# Patient Record
Sex: FEMALE | Race: WHITE | Employment: OTHER | URBAN - METROPOLITAN AREA
[De-identification: names, ages, dates, MRNs, and addresses within clinical notes are randomized per-mention and may not be internally consistent; named-entity substitution may affect disease eponyms.]

---

## 2018-12-11 ENCOUNTER — APPOINTMENT (OUTPATIENT)
Dept: ULTRASOUND IMAGING | Facility: HOSPITAL | Age: 83
End: 2018-12-11
Attending: EMERGENCY MEDICINE
Payer: MEDICARE

## 2018-12-11 ENCOUNTER — HOSPITAL ENCOUNTER (EMERGENCY)
Facility: HOSPITAL | Age: 83
Discharge: HOME | End: 2018-12-11
Attending: EMERGENCY MEDICINE
Payer: MEDICARE

## 2018-12-11 VITALS
HEART RATE: 95 BPM | DIASTOLIC BLOOD PRESSURE: 64 MMHG | WEIGHT: 175 LBS | OXYGEN SATURATION: 95 % | TEMPERATURE: 99 F | SYSTOLIC BLOOD PRESSURE: 157 MMHG | HEIGHT: 64 IN | BODY MASS INDEX: 29.88 KG/M2 | RESPIRATION RATE: 18 BRPM

## 2018-12-11 DIAGNOSIS — M79.604 PAIN OF RIGHT LOWER EXTREMITY: Primary | ICD-10-CM

## 2018-12-11 PROCEDURE — 99284 EMERGENCY DEPT VISIT MOD MDM: CPT

## 2018-12-11 PROCEDURE — 85025 COMPLETE CBC W/AUTO DIFF WBC: CPT | Performed by: EMERGENCY MEDICINE

## 2018-12-11 PROCEDURE — 93971 EXTREMITY STUDY: CPT | Performed by: EMERGENCY MEDICINE

## 2018-12-11 PROCEDURE — 36415 COLL VENOUS BLD VENIPUNCTURE: CPT

## 2018-12-11 PROCEDURE — 80048 BASIC METABOLIC PNL TOTAL CA: CPT | Performed by: EMERGENCY MEDICINE

## 2018-12-11 RX ORDER — HYDROCODONE BITARTRATE AND ACETAMINOPHEN 5; 325 MG/1; MG/1
1 TABLET ORAL ONCE
Status: COMPLETED | OUTPATIENT
Start: 2018-12-11 | End: 2018-12-11

## 2018-12-11 RX ORDER — HYDROCODONE BITARTRATE AND ACETAMINOPHEN 5; 325 MG/1; MG/1
1-2 TABLET ORAL EVERY 4 HOURS PRN
Qty: 15 TABLET | Refills: 0 | Status: ON HOLD | OUTPATIENT
Start: 2018-12-11 | End: 2018-12-19

## 2018-12-11 RX ORDER — IBUPROFEN 600 MG/1
600 TABLET ORAL EVERY 8 HOURS PRN
Qty: 20 TABLET | Refills: 0 | Status: ON HOLD | OUTPATIENT
Start: 2018-12-11 | End: 2018-12-19

## 2018-12-11 NOTE — ED PROVIDER NOTES
Patient Seen in: [facility] AND [facility] Emergency Department    History   Patient presents with:  Deep Vein Thrombosis (cardiovascular)    Stated Complaint:     HPI    74-year-old female with history of diabetes, hypertension, hyperlipidemia, previous blood c signs reviewed. All other systems reviewed and negative except as noted above.     Physical Exam     ED Triage Vitals [12/11/18 1015]   /64   Pulse 95   Resp 18   Temp 98.6 °F (37 °C)   Temp src Oral   SpO2 95 %   O2 Device None (Room air) 70 - 99 mg/dL    Sodium 137 136 - 144 mmol/L    Potassium 3.9 3.3 - 5.1 mmol/L    Chloride 104 95 - 110 mmol/L    CO2 25 22 - 32 mmol/L    BUN 15 8 - 20 mg/dL    Creatinine 0.79 0.50 - 1.50 mg/dL    Calcium, Total 9.1 8.5 - 10.5 mg/dL    BUN/CREA Ratio 19. dangers of undiagnosed and untreated hypertension. Education regarding lifestyle modifications and the need for appropriate follow-up with their PCP to have their blood pressure re-checked within 1 week was provided.      Medical Record Review: I personall (eight) hours as needed., Normal, Disp-20 tablet, R-0

## 2018-12-11 NOTE — ED INITIAL ASSESSMENT (HPI)
Pt reports right thigh pain that began last night. Pt reports hx of a blood clot in the same leg. Pt states that the pain worsened overnight. Pt denies trauma to ext, recent travels or long car rides. Pt denies being on a blood thinner.

## 2018-12-17 ENCOUNTER — HOSPITAL ENCOUNTER (INPATIENT)
Facility: HOSPITAL | Age: 83
LOS: 2 days | Discharge: HOME | DRG: 552 | End: 2018-12-19
Attending: EMERGENCY MEDICINE | Admitting: INTERNAL MEDICINE
Payer: MEDICARE

## 2018-12-17 ENCOUNTER — APPOINTMENT (OUTPATIENT)
Dept: GENERAL RADIOLOGY | Facility: HOSPITAL | Age: 83
DRG: 552 | End: 2018-12-17
Attending: EMERGENCY MEDICINE
Payer: MEDICARE

## 2018-12-17 ENCOUNTER — APPOINTMENT (OUTPATIENT)
Dept: MRI IMAGING | Facility: HOSPITAL | Age: 83
DRG: 552 | End: 2018-12-17
Attending: EMERGENCY MEDICINE
Payer: MEDICARE

## 2018-12-17 DIAGNOSIS — E83.42 HYPOMAGNESEMIA: ICD-10-CM

## 2018-12-17 DIAGNOSIS — R26.2 INABILITY TO WALK: ICD-10-CM

## 2018-12-17 DIAGNOSIS — M79.18 MUSCULOSKELETAL PAIN: Primary | ICD-10-CM

## 2018-12-17 PROCEDURE — 72148 MRI LUMBAR SPINE W/O DYE: CPT | Performed by: EMERGENCY MEDICINE

## 2018-12-17 PROCEDURE — 96375 TX/PRO/DX INJ NEW DRUG ADDON: CPT

## 2018-12-17 PROCEDURE — 82962 GLUCOSE BLOOD TEST: CPT

## 2018-12-17 PROCEDURE — 96365 THER/PROPH/DIAG IV INF INIT: CPT

## 2018-12-17 PROCEDURE — 99285 EMERGENCY DEPT VISIT HI MDM: CPT

## 2018-12-17 PROCEDURE — 96366 THER/PROPH/DIAG IV INF ADDON: CPT

## 2018-12-17 PROCEDURE — 80048 BASIC METABOLIC PNL TOTAL CA: CPT | Performed by: EMERGENCY MEDICINE

## 2018-12-17 PROCEDURE — 73552 X-RAY EXAM OF FEMUR 2/>: CPT | Performed by: EMERGENCY MEDICINE

## 2018-12-17 PROCEDURE — 85025 COMPLETE CBC W/AUTO DIFF WBC: CPT | Performed by: EMERGENCY MEDICINE

## 2018-12-17 PROCEDURE — 73721 MRI JNT OF LWR EXTRE W/O DYE: CPT | Performed by: EMERGENCY MEDICINE

## 2018-12-17 PROCEDURE — 72170 X-RAY EXAM OF PELVIS: CPT | Performed by: EMERGENCY MEDICINE

## 2018-12-17 PROCEDURE — 83036 HEMOGLOBIN GLYCOSYLATED A1C: CPT | Performed by: INTERNAL MEDICINE

## 2018-12-17 PROCEDURE — 83735 ASSAY OF MAGNESIUM: CPT | Performed by: EMERGENCY MEDICINE

## 2018-12-17 RX ORDER — ONDANSETRON 2 MG/ML
4 INJECTION INTRAMUSCULAR; INTRAVENOUS EVERY 6 HOURS PRN
Status: DISCONTINUED | OUTPATIENT
Start: 2018-12-17 | End: 2018-12-19

## 2018-12-17 RX ORDER — MORPHINE SULFATE 2 MG/ML
2 INJECTION, SOLUTION INTRAMUSCULAR; INTRAVENOUS EVERY 2 HOUR PRN
Status: DISCONTINUED | OUTPATIENT
Start: 2018-12-17 | End: 2018-12-19

## 2018-12-17 RX ORDER — MORPHINE SULFATE 4 MG/ML
6 INJECTION, SOLUTION INTRAMUSCULAR; INTRAVENOUS EVERY 2 HOUR PRN
Status: DISCONTINUED | OUTPATIENT
Start: 2018-12-17 | End: 2018-12-19

## 2018-12-17 RX ORDER — ACETAMINOPHEN 325 MG/1
650 TABLET ORAL EVERY 4 HOURS PRN
Status: DISCONTINUED | OUTPATIENT
Start: 2018-12-17 | End: 2018-12-19

## 2018-12-17 RX ORDER — HYDROCHLOROTHIAZIDE 12.5 MG/1
25 CAPSULE, GELATIN COATED ORAL DAILY
COMMUNITY
End: 2019-09-18

## 2018-12-17 RX ORDER — METHYLPREDNISOLONE 4 MG/1
4 TABLET ORAL
Status: DISCONTINUED | OUTPATIENT
Start: 2018-12-20 | End: 2018-12-19

## 2018-12-17 RX ORDER — DEXTROSE MONOHYDRATE 25 G/50ML
50 INJECTION, SOLUTION INTRAVENOUS AS NEEDED
Status: DISCONTINUED | OUTPATIENT
Start: 2018-12-17 | End: 2018-12-18

## 2018-12-17 RX ORDER — KETOROLAC TROMETHAMINE 15 MG/ML
15 INJECTION, SOLUTION INTRAMUSCULAR; INTRAVENOUS ONCE
Status: COMPLETED | OUTPATIENT
Start: 2018-12-17 | End: 2018-12-17

## 2018-12-17 RX ORDER — METHYLPREDNISOLONE 4 MG/1
4 TABLET ORAL
Status: DISCONTINUED | OUTPATIENT
Start: 2018-12-19 | End: 2018-12-19

## 2018-12-17 RX ORDER — SIMVASTATIN 20 MG
40 TABLET ORAL NIGHTLY
COMMUNITY

## 2018-12-17 RX ORDER — NIFEDIPINE 90 MG/1
90 TABLET, FILM COATED, EXTENDED RELEASE ORAL DAILY
COMMUNITY

## 2018-12-17 RX ORDER — HYDROCODONE BITARTRATE AND ACETAMINOPHEN 5; 325 MG/1; MG/1
1 TABLET ORAL EVERY 4 HOURS PRN
Status: DISCONTINUED | OUTPATIENT
Start: 2018-12-17 | End: 2018-12-19

## 2018-12-17 RX ORDER — GABAPENTIN 300 MG/1
300 CAPSULE ORAL NIGHTLY
Status: DISCONTINUED | OUTPATIENT
Start: 2018-12-17 | End: 2018-12-19

## 2018-12-17 RX ORDER — METOPROLOL SUCCINATE 50 MG/1
50 TABLET, EXTENDED RELEASE ORAL DAILY
COMMUNITY

## 2018-12-17 RX ORDER — METOCLOPRAMIDE HYDROCHLORIDE 5 MG/ML
10 INJECTION INTRAMUSCULAR; INTRAVENOUS EVERY 8 HOURS PRN
Status: DISCONTINUED | OUTPATIENT
Start: 2018-12-17 | End: 2018-12-19

## 2018-12-17 RX ORDER — METHYLPREDNISOLONE 4 MG/1
4 TABLET ORAL 2 TIMES DAILY WITH MEALS
Status: COMPLETED | OUTPATIENT
Start: 2018-12-18 | End: 2018-12-18

## 2018-12-17 RX ORDER — POLYETHYLENE GLYCOL 3350 17 G/17G
17 POWDER, FOR SOLUTION ORAL DAILY PRN
Status: DISCONTINUED | OUTPATIENT
Start: 2018-12-17 | End: 2018-12-19

## 2018-12-17 RX ORDER — METHYLPREDNISOLONE 4 MG/1
4 TABLET ORAL
Status: DISCONTINUED | OUTPATIENT
Start: 2018-12-20 | End: 2018-12-17 | Stop reason: CLARIF

## 2018-12-17 RX ORDER — METHYLPREDNISOLONE 4 MG/1
8 TABLET ORAL
Status: DISCONTINUED | OUTPATIENT
Start: 2018-12-19 | End: 2018-12-19

## 2018-12-17 RX ORDER — METOPROLOL SUCCINATE 50 MG/1
50 TABLET, EXTENDED RELEASE ORAL DAILY
Status: DISCONTINUED | OUTPATIENT
Start: 2018-12-18 | End: 2018-12-19

## 2018-12-17 RX ORDER — METHYLPREDNISOLONE 4 MG/1
4 TABLET ORAL 3 TIMES DAILY
Status: DISCONTINUED | OUTPATIENT
Start: 2018-12-21 | End: 2018-12-19

## 2018-12-17 RX ORDER — LIDOCAINE 50 MG/G
1 PATCH TOPICAL EVERY 24 HOURS
Status: DISCONTINUED | OUTPATIENT
Start: 2018-12-17 | End: 2018-12-19

## 2018-12-17 RX ORDER — ENALAPRIL MALEATE 10 MG/1
10 TABLET ORAL DAILY
Status: DISCONTINUED | OUTPATIENT
Start: 2018-12-18 | End: 2018-12-19

## 2018-12-17 RX ORDER — MORPHINE SULFATE 4 MG/ML
4 INJECTION, SOLUTION INTRAMUSCULAR; INTRAVENOUS EVERY 2 HOUR PRN
Status: DISCONTINUED | OUTPATIENT
Start: 2018-12-17 | End: 2018-12-19

## 2018-12-17 RX ORDER — MORPHINE SULFATE 4 MG/ML
4 INJECTION, SOLUTION INTRAMUSCULAR; INTRAVENOUS ONCE
Status: COMPLETED | OUTPATIENT
Start: 2018-12-17 | End: 2018-12-17

## 2018-12-17 RX ORDER — HEPARIN SODIUM 5000 [USP'U]/ML
5000 INJECTION, SOLUTION INTRAVENOUS; SUBCUTANEOUS ONCE
Status: COMPLETED | OUTPATIENT
Start: 2018-12-17 | End: 2018-12-17

## 2018-12-17 RX ORDER — MAGNESIUM SULFATE HEPTAHYDRATE 40 MG/ML
2 INJECTION, SOLUTION INTRAVENOUS ONCE
Status: COMPLETED | OUTPATIENT
Start: 2018-12-17 | End: 2018-12-17

## 2018-12-17 RX ORDER — GLYBURIDE 2.5 MG/1
2.5 TABLET ORAL AS NEEDED
COMMUNITY
End: 2019-09-18

## 2018-12-17 RX ORDER — SODIUM PHOSPHATE, DIBASIC AND SODIUM PHOSPHATE, MONOBASIC 7; 19 G/133ML; G/133ML
1 ENEMA RECTAL ONCE AS NEEDED
Status: DISCONTINUED | OUTPATIENT
Start: 2018-12-17 | End: 2018-12-19

## 2018-12-17 RX ORDER — HYDROCHLOROTHIAZIDE 12.5 MG/1
12.5 CAPSULE, GELATIN COATED ORAL DAILY
Status: DISCONTINUED | OUTPATIENT
Start: 2018-12-18 | End: 2018-12-19

## 2018-12-17 RX ORDER — DOCUSATE SODIUM 100 MG/1
100 CAPSULE, LIQUID FILLED ORAL 2 TIMES DAILY
Status: DISCONTINUED | OUTPATIENT
Start: 2018-12-17 | End: 2018-12-19

## 2018-12-17 RX ORDER — METHYLPREDNISOLONE 4 MG/1
8 TABLET ORAL
Status: COMPLETED | OUTPATIENT
Start: 2018-12-18 | End: 2018-12-18

## 2018-12-17 RX ORDER — HYDROCODONE BITARTRATE AND ACETAMINOPHEN 5; 325 MG/1; MG/1
2 TABLET ORAL EVERY 4 HOURS PRN
Status: DISCONTINUED | OUTPATIENT
Start: 2018-12-17 | End: 2018-12-19

## 2018-12-17 RX ORDER — ENALAPRIL MALEATE 10 MG/1
10 TABLET ORAL DAILY
COMMUNITY

## 2018-12-17 RX ORDER — ATORVASTATIN CALCIUM 10 MG/1
10 TABLET, FILM COATED ORAL NIGHTLY
Status: DISCONTINUED | OUTPATIENT
Start: 2018-12-17 | End: 2018-12-19

## 2018-12-17 RX ORDER — METHYLPREDNISOLONE 4 MG/1
4 TABLET ORAL 2 TIMES DAILY
Status: DISCONTINUED | OUTPATIENT
Start: 2018-12-22 | End: 2018-12-19

## 2018-12-17 RX ORDER — BISACODYL 10 MG
10 SUPPOSITORY, RECTAL RECTAL
Status: DISCONTINUED | OUTPATIENT
Start: 2018-12-17 | End: 2018-12-19

## 2018-12-17 RX ORDER — SODIUM CHLORIDE 0.9 % (FLUSH) 0.9 %
3 SYRINGE (ML) INJECTION AS NEEDED
Status: DISCONTINUED | OUTPATIENT
Start: 2018-12-17 | End: 2018-12-19

## 2018-12-17 RX ORDER — METHYLPREDNISOLONE 4 MG/1
4 TABLET ORAL
Status: DISCONTINUED | OUTPATIENT
Start: 2018-12-23 | End: 2018-12-19

## 2018-12-17 NOTE — ED PROVIDER NOTES
Patient Seen in: [facility] AND [facility] Emergency Department    History   Patient presents with:  Pain (neurologic)    Stated Complaint:     HPI    51-year-old female with history of diabetes back pain and arthritis who was here about 6 days ago complaints of time.  Appears well-developed. No distress. Head: Normocephalic and atraumatic. Eyes: Conjunctivae are normal. Pupils are equal, round, and reactive to light. Cardiovascular: Normal rate, regular rhythm and intact distal pulses.     Pulmonary/Chest: E tests on the individual orders. Xr Femur Min 2 Views Right (cpt=73552)    Result Date: 12/17/2018  PROCEDURE: XR FEMUR MIN 2 VIEWS RIGHT (XXI=20534)  COMPARISON: [facility], XR PELVIS (1 VIEW) (CPT=72170), 12/17/2018, 17:03.   [place] lower extremity was performed in the usual manner. FINDINGS: The femoral and popliteal veins appear normal.  Normal flow was demonstrated with color and pulsed Doppler.   Visualized portions of the great and small saphenous, posterior tibial, and peroneal

## 2018-12-18 ENCOUNTER — ANESTHESIA (OUTPATIENT)
Dept: SURGERY | Facility: HOSPITAL | Age: 83
End: 2018-12-18

## 2018-12-18 ENCOUNTER — APPOINTMENT (OUTPATIENT)
Dept: GENERAL RADIOLOGY | Facility: HOSPITAL | Age: 83
DRG: 552 | End: 2018-12-18
Attending: ANESTHESIOLOGY
Payer: MEDICARE

## 2018-12-18 ENCOUNTER — ANESTHESIA EVENT (OUTPATIENT)
Dept: SURGERY | Facility: HOSPITAL | Age: 83
End: 2018-12-18

## 2018-12-18 PROCEDURE — 85610 PROTHROMBIN TIME: CPT | Performed by: INTERNAL MEDICINE

## 2018-12-18 PROCEDURE — BR161ZZ FLUOROSCOPY OF LUMBAR FACET JOINT(S) USING LOW OSMOLAR CONTRAST: ICD-10-PCS | Performed by: ANESTHESIOLOGY

## 2018-12-18 PROCEDURE — A4216 STERILE WATER/SALINE, 10 ML: HCPCS

## 2018-12-18 PROCEDURE — 80048 BASIC METABOLIC PNL TOTAL CA: CPT | Performed by: INTERNAL MEDICINE

## 2018-12-18 PROCEDURE — A4216 STERILE WATER/SALINE, 10 ML: HCPCS | Performed by: INTERNAL MEDICINE

## 2018-12-18 PROCEDURE — 97116 GAIT TRAINING THERAPY: CPT

## 2018-12-18 PROCEDURE — 83735 ASSAY OF MAGNESIUM: CPT | Performed by: INTERNAL MEDICINE

## 2018-12-18 PROCEDURE — 82962 GLUCOSE BLOOD TEST: CPT

## 2018-12-18 PROCEDURE — 97165 OT EVAL LOW COMPLEX 30 MIN: CPT

## 2018-12-18 PROCEDURE — 97161 PT EVAL LOW COMPLEX 20 MIN: CPT

## 2018-12-18 PROCEDURE — 3E0R33Z INTRODUCTION OF ANTI-INFLAMMATORY INTO SPINAL CANAL, PERCUTANEOUS APPROACH: ICD-10-PCS | Performed by: ANESTHESIOLOGY

## 2018-12-18 PROCEDURE — 97535 SELF CARE MNGMENT TRAINING: CPT

## 2018-12-18 PROCEDURE — 85027 COMPLETE CBC AUTOMATED: CPT | Performed by: INTERNAL MEDICINE

## 2018-12-18 RX ORDER — LIDOCAINE HYDROCHLORIDE 10 MG/ML
INJECTION, SOLUTION EPIDURAL; INFILTRATION; INTRACAUDAL; PERINEURAL AS NEEDED
Status: DISCONTINUED | OUTPATIENT
Start: 2018-12-18 | End: 2018-12-18 | Stop reason: HOSPADM

## 2018-12-18 RX ORDER — TRIAMCINOLONE ACETONIDE 40 MG/ML
INJECTION, SUSPENSION INTRA-ARTICULAR; INTRAMUSCULAR AS NEEDED
Status: DISCONTINUED | OUTPATIENT
Start: 2018-12-18 | End: 2018-12-18 | Stop reason: HOSPADM

## 2018-12-18 RX ORDER — 0.9 % SODIUM CHLORIDE 0.9 %
VIAL (ML) INJECTION
Status: DISPENSED
Start: 2018-12-18 | End: 2018-12-18

## 2018-12-18 RX ORDER — POTASSIUM CHLORIDE 20 MEQ/1
40 TABLET, EXTENDED RELEASE ORAL EVERY 4 HOURS
Status: COMPLETED | OUTPATIENT
Start: 2018-12-18 | End: 2018-12-18

## 2018-12-18 RX ORDER — HEPARIN SODIUM 5000 [USP'U]/ML
5000 INJECTION, SOLUTION INTRAVENOUS; SUBCUTANEOUS EVERY 8 HOURS SCHEDULED
Status: DISCONTINUED | OUTPATIENT
Start: 2018-12-18 | End: 2018-12-19

## 2018-12-18 RX ORDER — DEXTROSE MONOHYDRATE 25 G/50ML
50 INJECTION, SOLUTION INTRAVENOUS AS NEEDED
Status: DISCONTINUED | OUTPATIENT
Start: 2018-12-18 | End: 2018-12-19

## 2018-12-18 NOTE — OCCUPATIONAL THERAPY NOTE
OCCUPATIONAL THERAPY EVALUATION - INPATIENT     Room Number: 550/550-A  Evaluation Date: 12/18/2018  Type of Evaluation: Initial  Presenting Problem: (RT thigh pain)    Physician Order: IP Consult to Occupational Therapy  Reason for Therapy: ADL/IADL Dys Diagnosis Date   • Anxiety state    • Arthritis    • Back pain    • Calculus of kidney    • COPD (chronic obstructive pulmonary disease) (HCC)    • Deep vein thrombosis (HCC)    • Diabetes (HCC)    • Esophageal reflux    • Essential hypertension    • Hig drying)?: A Little  -   Toileting, which includes using toilet, bedpan or urinal? : None  -   Putting on and taking off regular upper body clothing?: None  -   Taking care of personal grooming such as brushing teeth?: None  -   Eating meals?: None    AM-PA

## 2018-12-18 NOTE — PLAN OF CARE
Problem: Patient Centered Care  Goal: Patient preferences are identified and integrated in the patient's plan of care  Interventions:  - What would you like us to know as we care for you?   walks with walker.   - Provide timely, complete, and accurat

## 2018-12-18 NOTE — H&P
[facility] Hospitalist H&P     CC: Patient presents with:  Pain (neurologic)       PCP: Page Moran    Admission Date: 12/17/2018    ASSESSMENT / PLAN:   Ms. Mayelin Marrufo is a 82year old female with PMH of DM, HTN, HLD, DVT who presented with right thigh pain US was checked and neg for DVT. Pt was given norco/ibuprofen and sent home from the ED which did not help. Patient then obtained an order for steroids from PCP, reportedly only once daily which did not seem to help either.  She has been mainly bed bound ove wheezes  CV: RRR, no murmurs, 2+ peripheral pulses  ABD: Soft, non-tender, non-distended, +BS  MSK: strength 5/5 in all extremities, full ROM of RLE, neg straight leg raise  Neuro: Grossly normal, CN intact, sensory intact  Psych: Affect- normal  SKIN: war

## 2018-12-18 NOTE — PROGRESS NOTES
[facility] Pharmacy Note:  Age Based Dose Adjustment    Skylar Forte has been prescribed ketorolac (TORADOL) 30 mg IV once. Patient is >71 years old therefore the dose has been adjusted to 15 mg IV once.       Thank you,  Sid Hooper, PharmD  12/17/2018

## 2018-12-18 NOTE — PROGRESS NOTES
[facility] Hospitalist Progress Note     Reason for Admission: right LE pain  PCP: DINESH HERNANDEZ     Assessment/Plan:     Principal Problem:    Musculoskeletal pain  Active Problems:    Inability to walk    Hypomagnesemia    Ms. Lisette Horan is a 82year old female 0935  Gross per 24 hour   Intake 0 ml   Output —   Net 0 ml       Last 3 Weights  12/17/18 2216 : 168 lb 12.8 oz (76.6 kg)  12/17/18 1614 : 172 lb (78 kg)  12/17/18 1924 : 165 lb 5.5 oz (75 kg)  12/11/18 1015 : 175 lb (79.4 kg)      Exam   GEN: NAD, obese (cpt=73721)    Result Date: 12/17/2018  CONCLUSION:   Moderate to advanced right hip osteoarthritis. No acute fracture or significant effusion. Right obturator externus strain. Right gluteus medius insertional tendinopathy.   Right hamstrings origin tendi mg Oral Daily with breakfast       dextrose, Glucose-Vitamin C, glucose, Normal Saline Flush, morphINE sulfate **OR** morphINE sulfate **OR** morphINE sulfate, acetaminophen **OR** HYDROcodone-acetaminophen **OR** HYDROcodone-acetaminophen, PEG 3350, milka

## 2018-12-18 NOTE — PHYSICAL THERAPY NOTE
PHYSICAL THERAPY QUICK EVALUATION - INPATIENT    Room Number: 550/550-A  Evaluation Date: 12/18/2018  Presenting Problem: LBP with radiculopathy  Physician Order: PT Eval and Treat    Problem List  Principal Problem:    Musculoskeletal pain  Active Probl from a chair with arms (e.g., wheelchair, bedside commode, etc.): None   -   Moving from lying on back to sitting on the side of the bed?: None   How much help from another person does the patient currently need. ..   -   Moving to and from a bed to a chair considered low. No further skilled PT intervention recommended at this time  PT Discharge Recommendations: Outpatient PT    PLAN  Patient has been evaluated and presents with no skilled Physical Therapy needs at this time.   Patient discharged from Physical

## 2018-12-18 NOTE — ED NOTES
Pt states that she has been having pain in her right leg from hip to knee and low back pain. States pain is \"excrutiating\" when she tries to bear weight and she is unable to get up to ambulate.   States, \"I haven't been able to get up and go to the bath

## 2018-12-18 NOTE — ED NOTES
Orders for admission, patient is aware of plan and ready to go upstairs. Any questions, please call ED RN Derick Biswas  at extension [phone].

## 2018-12-18 NOTE — CM/SW NOTE
12/18/18 1600   CM/SW Referral Data   Referral Source Physician   Pertinent Medical Hx   Primary Care Physician Name ( Natalia Espino)   Date of Last Contact with PCP ( every 3 months. )   Patient Info   Advanced directives?  No   Patient's Mental Status

## 2018-12-18 NOTE — PROCEDURES
[facility] Patient Status:  Inpatient    1935 MRN [id number]   Location [place] [address] Attending Bc Eli MD   [facility] Day # 1 [facility]  Lumbar spinal stenosis  Rt

## 2018-12-18 NOTE — PROGRESS NOTES
[facility]  Report of Consultation    Melissa Bryan Patient Status:  Inpatient    1935 MRN [id number]   Location [facility] 5SW/SE Attending Carmen Pablo MD   [facility] Day # 1 PCP Tom Griffiths     Date of Admission:   Allergies    Medications:    Current Facility-Administered Medications:   •  Sodium Chloride 0.9 % solution, , ,   •  Insulin Regular Human (NOVOLIN R) 100 UNIT/ML injection 1-5 Units, 1-5 Units, Subcutaneous, 4 times per day  •  potassium chloride 40 mEq **FOLLOWED BY** methylPREDNISolone (MEDROL) tab 8 mg, 8 mg, Oral, nightly  •  [START ON 12/19/2018] methylPREDNISolone (MEDROL) tab 4 mg, 4 mg, Oral, TID CC  •  [START ON 12/19/2018] methylPREDNISolone (MEDROL) tab 8 mg, 8 mg, Oral, nightly  •  [START ON 1 Imaging:    PROCEDURE:  MRI SPINE LUMBAR (CPT=72148)     COMPARISON: None.      INDICATIONS:  Right thigh radicular pain, inability to walk     TECHNIQUE:    A variety of imaging planes and parameters were utilized for visualization of suspected patho narrowing. L5-S1: Moderate foraminal narrowing.      Dictated by (CST): Reginald Meek MD on 12/17/2018 at 21:02       Approved by (CST): Reginald Meek MD on 12/17/2018 at 21:09              PROCEDURE:  MRI HIPS, RIGHT (CPT=73721)     COMPARISON: El patient. Comprehensive analgesic plan was formulated. Conservative vs. Aggressive measures were discussed at length including pharmacotherapy (eg.  Anti- inflammatories, muscle relaxants, neuropathic medications, oral steroids, analgesics), injection

## 2018-12-19 VITALS
HEIGHT: 61.2 IN | TEMPERATURE: 98 F | OXYGEN SATURATION: 96 % | BODY MASS INDEX: 31.87 KG/M2 | RESPIRATION RATE: 18 BRPM | DIASTOLIC BLOOD PRESSURE: 79 MMHG | SYSTOLIC BLOOD PRESSURE: 177 MMHG | WEIGHT: 168.81 LBS | HEART RATE: 81 BPM

## 2018-12-19 PROCEDURE — 84132 ASSAY OF SERUM POTASSIUM: CPT | Performed by: INTERNAL MEDICINE

## 2018-12-19 PROCEDURE — 82962 GLUCOSE BLOOD TEST: CPT

## 2018-12-19 RX ORDER — METHYLPREDNISOLONE 4 MG/1
4 TABLET ORAL SEE ADMIN INSTRUCTIONS
Qty: 3 TABLET | Refills: 0 | Status: SHIPPED | OUTPATIENT
Start: 2018-12-19 | End: 2018-12-20

## 2018-12-19 RX ORDER — HYDROCODONE BITARTRATE AND ACETAMINOPHEN 5; 325 MG/1; MG/1
1 TABLET ORAL ONCE
Status: COMPLETED | OUTPATIENT
Start: 2018-12-19 | End: 2018-12-19

## 2018-12-19 RX ORDER — METHYLPREDNISOLONE 4 MG/1
4 TABLET ORAL 2 TIMES DAILY
Qty: 2 TABLET | Refills: 0 | Status: SHIPPED | OUTPATIENT
Start: 2018-12-22 | End: 2018-12-23

## 2018-12-19 RX ORDER — HYDROCODONE BITARTRATE AND ACETAMINOPHEN 10; 325 MG/1; MG/1
1 TABLET ORAL EVERY 4 HOURS PRN
Qty: 30 TABLET | Refills: 0 | Status: SHIPPED | OUTPATIENT
Start: 2018-12-19 | End: 2019-09-18

## 2018-12-19 RX ORDER — METHYLPREDNISOLONE 4 MG/1
4 TABLET ORAL 3 TIMES DAILY
Qty: 3 TABLET | Refills: 0 | Status: SHIPPED | OUTPATIENT
Start: 2018-12-21 | End: 2018-12-22

## 2018-12-19 RX ORDER — GABAPENTIN 300 MG/1
300 CAPSULE ORAL NIGHTLY
Qty: 30 CAPSULE | Refills: 0 | Status: SHIPPED | OUTPATIENT
Start: 2018-12-19 | End: 2019-09-18

## 2018-12-19 RX ORDER — IBUPROFEN 600 MG/1
600 TABLET ORAL EVERY 8 HOURS PRN
Qty: 20 TABLET | Refills: 0 | Status: SHIPPED | OUTPATIENT
Start: 2018-12-19 | End: 2018-12-26

## 2018-12-19 RX ORDER — METHYLPREDNISOLONE 4 MG/1
4 TABLET ORAL
Qty: 4 TABLET | Refills: 0 | Status: SHIPPED | OUTPATIENT
Start: 2018-12-20 | End: 2019-09-18

## 2018-12-19 RX ORDER — LIDOCAINE 50 MG/G
1 PATCH TOPICAL EVERY 24 HOURS
Qty: 30 PATCH | Refills: 0 | Status: SHIPPED | OUTPATIENT
Start: 2018-12-19 | End: 2019-09-18

## 2018-12-19 RX ORDER — METHYLPREDNISOLONE 4 MG/1
4 TABLET ORAL
Qty: 1 TABLET | Refills: 0 | Status: SHIPPED | OUTPATIENT
Start: 2018-12-23 | End: 2018-12-24

## 2018-12-19 RX ORDER — HYDROCODONE BITARTRATE AND ACETAMINOPHEN 10; 325 MG/1; MG/1
1 TABLET ORAL EVERY 4 HOURS PRN
Status: DISCONTINUED | OUTPATIENT
Start: 2018-12-19 | End: 2018-12-19

## 2018-12-19 NOTE — PLAN OF CARE
Order received for discharge  Saline lock dc'd with catheter intact band aid applied    Patient instructed on all dc teaching and RX  Patient verbalized understanding of all dc teaching    Accompanied patient to lobby per wc per RN    Marifer Velarde, MSN

## 2018-12-19 NOTE — CHRONIC PAIN
[facility]  Anesthesiology Pain Management Progress Note      Patient name: Garrick Roberts 82year old female  : 1935  MRN: [id number]    Reason for Consult: Low back pain radiating to R LE    Current hospital day: Hospital Da sulfate **OR** morphINE sulfate **OR** morphINE sulfate, acetaminophen **OR** HYDROcodone-acetaminophen **OR** HYDROcodone-acetaminophen, PEG 3350, magnesium hydroxide, bisacodyl, FLEET ENEMA, ondansetron HCl, Metoclopramide HCl    Exam:Blood pressure (!)

## 2018-12-21 NOTE — DISCHARGE SUMMARY
[facility] Hospitalist Discharge Summary   Patient ID:  Yisel Archer  [id number]  55 year old  11/25/1935    Admit date: 12/17/2018  Discharge date: 12/19/2018  Primary Care Physician: Page Moran   Attending Physician: No att. providers found   Consults -continue BB, enalapril, hctz     #DM:  -held home metformin, A1C ordered at 6.8  -SSI cont during stay     #Hx of DVT:  -no long on anticoagulation  -US neg on 12/11     #HLD:  -statin         EXAM:   GENERAL: no apparent distress, comfortable  NEURO: A/A * methylPREDNISolone 4 MG Tabs  Commonly known as:  MEDROL  Take 1 tablet (4 mg total) by mouth daily with breakfast for 1 day. Start taking on:  12/23/2018         * This list has 5 medication(s) that are the same as other medications prescribed for you. Aleta Moreno MD. Schedule an appointment as soon as possible for a visit in 1 week. Specialties:  Anesthesiology, PAIN MANAGEMENT  Why:  pain clinic for follow-up  Contact information:  [address] [facility] [phone]

## 2019-09-18 ENCOUNTER — HOSPITAL ENCOUNTER (OUTPATIENT)
Age: 84
Discharge: HOME | End: 2019-09-18
Attending: EMERGENCY MEDICINE
Payer: MEDICARE

## 2019-09-18 ENCOUNTER — APPOINTMENT (OUTPATIENT)
Dept: GENERAL RADIOLOGY | Age: 84
End: 2019-09-18
Attending: EMERGENCY MEDICINE
Payer: MEDICARE

## 2019-09-18 VITALS
BODY MASS INDEX: 30.91 KG/M2 | HEIGHT: 62 IN | TEMPERATURE: 98 F | SYSTOLIC BLOOD PRESSURE: 153 MMHG | DIASTOLIC BLOOD PRESSURE: 65 MMHG | OXYGEN SATURATION: 98 % | HEART RATE: 94 BPM | RESPIRATION RATE: 18 BRPM | WEIGHT: 168 LBS

## 2019-09-18 DIAGNOSIS — M77.31 HEEL SPUR, RIGHT: Primary | ICD-10-CM

## 2019-09-18 PROCEDURE — 99213 OFFICE O/P EST LOW 20 MIN: CPT

## 2019-09-18 PROCEDURE — 99214 OFFICE O/P EST MOD 30 MIN: CPT

## 2019-09-18 PROCEDURE — 73650 X-RAY EXAM OF HEEL: CPT | Performed by: EMERGENCY MEDICINE

## 2019-09-18 RX ORDER — TRIAMTERENE AND HYDROCHLOROTHIAZIDE 37.5; 25 MG/1; MG/1
1 CAPSULE ORAL EVERY MORNING
COMMUNITY

## 2019-09-18 RX ORDER — IBUPROFEN 600 MG/1
600 TABLET ORAL EVERY 8 HOURS PRN
Qty: 30 TABLET | Refills: 0 | Status: SHIPPED | OUTPATIENT
Start: 2019-09-18 | End: 2019-10-03

## 2019-09-18 RX ORDER — GLIMEPIRIDE 2 MG/1
2 TABLET ORAL
COMMUNITY

## 2019-09-18 NOTE — ED INITIAL ASSESSMENT (HPI)
Pt presents to the IC with c/o a possible FB to the right heel for the last 8 days. Pt had a home nurse look at the site yesterday and noted a possible blister and redness. No fevers.

## 2019-09-18 NOTE — ED NOTES
Pt reports having daily thoughts about wanting to fall asleep and not wake up. Pt attributes this to the stress and anxiety of taking care of her  who has dementia.  When asked if she would ever act on these thoughts, pt states \"oh no! i'm too much

## 2019-09-18 NOTE — ED NOTES
Dr Field Spine updated regarding discussion with LO and the patient's decision. Verbalized pt is ok to be discharged home.

## 2019-09-18 NOTE — ED PROVIDER NOTES
Patient Seen in: [address]      History   Patient presents with:  FB in Skin (integumentary)    Stated Complaint: right foot pain    HPI    Patient is a 82-year-old female who presents to immediate care complaining of r Positive for stated complaint: right foot pain  Other systems are as noted in HPI. Constitutional and vital signs reviewed. All other systems reviewed and negative except as noted above.     Physical Exam     ED Triage Vitals [09/18/19 1411]   / [address] [facility] [id number] [phone]    In 1 week  If symptoms worsen        Medications Prescribed:  Current Discharge Medication List    START taking these medications    ibuprofen 600 MG Oral Tab  Take 1 tablet (600 mg total) by mouth every 8 (eight)

## 2019-09-18 NOTE — ED NOTES
Spoke to Anjana in [facility] regarding possible outpatient care for Romel. Anjana is aware there is no active SI/HI but that pt has passive thoughts.  Offered 2 options: pt can see a therapist at 3 or 4pm today or she can alert the Navigator Team who

## (undated) DEVICE — OMNIPAQUE 240ML VIAL

## (undated) DEVICE — Device: Brand: JELCO

## (undated) DEVICE — TOWEL OR BLU 16X26 STRL

## (undated) DEVICE — CHLORAPREP ORANGE TINT 10.5ML

## (undated) DEVICE — NEEDLE SPINAL 22X3-1/2 BLK

## (undated) DEVICE — 12 ML SYRINGE LUER-LOCK TIP: Brand: MONOJECT

## (undated) DEVICE — ALARIS SMARTSITE EXTENSION SET: Brand: ALARIS, SMARTSITE

## (undated) NOTE — ED AVS SNAPSHOT
Peyman Sarai   MRN: [id number]    Department:  [facility] Emergency Department   Date of Visit:  12/11/2018           Disclosure     Insurance plans vary and the physician(s) referred by the ER may not be covered by your plan.  Please con within the next three months to obtain basic health screening including reassessment of your blood pressure.     IF THERE IS ANY CHANGE OR WORSENING OF YOUR CONDITION, CALL YOUR PRIMARY CARE PHYSICIAN AT ONCE OR RETURN IMMEDIATELY TO THE EMERGENCY DEPARTMEN